# Patient Record
Sex: MALE | Race: BLACK OR AFRICAN AMERICAN | NOT HISPANIC OR LATINO | URBAN - METROPOLITAN AREA
[De-identification: names, ages, dates, MRNs, and addresses within clinical notes are randomized per-mention and may not be internally consistent; named-entity substitution may affect disease eponyms.]

---

## 2024-08-15 ENCOUNTER — APPOINTMENT (RX ONLY)
Dept: URBAN - METROPOLITAN AREA CLINIC 93 | Facility: CLINIC | Age: 49
Setting detail: DERMATOLOGY
End: 2024-08-15

## 2024-08-15 DIAGNOSIS — L81.4 OTHER MELANIN HYPERPIGMENTATION: ICD-10-CM | Status: INADEQUATELY CONTROLLED

## 2024-08-15 DIAGNOSIS — L60.8 OTHER NAIL DISORDERS: ICD-10-CM | Status: STABLE

## 2024-08-15 DIAGNOSIS — Z41.9 ENCOUNTER FOR PROCEDURE FOR PURPOSES OTHER THAN REMEDYING HEALTH STATE, UNSPECIFIED: ICD-10-CM

## 2024-08-15 PROCEDURE — ? SUNSCREEN RECOMMENDATIONS

## 2024-08-15 PROCEDURE — ? PRODUCT LINE (REVISION)

## 2024-08-15 PROCEDURE — ? PRESCRIPTION

## 2024-08-15 PROCEDURE — ? COUNSELING

## 2024-08-15 PROCEDURE — ? ADDITIONAL NOTES

## 2024-08-15 PROCEDURE — ? COSMETIC CONSULTATION: CHEMICAL PEELS

## 2024-08-15 PROCEDURE — 99203 OFFICE O/P NEW LOW 30 MIN: CPT

## 2024-08-15 RX ORDER — HYDROQUINONE 40 MG/G
CREAM TOPICAL QHS
Qty: 28.35 | Refills: 2 | Status: ERX | COMMUNITY
Start: 2024-08-15

## 2024-08-15 RX ADMIN — HYDROQUINONE: 40 CREAM TOPICAL at 00:00

## 2024-08-15 ASSESSMENT — LOCATION ZONE DERM
LOCATION ZONE: FACE
LOCATION ZONE: FINGERNAIL

## 2024-08-15 ASSESSMENT — LOCATION SIMPLE DESCRIPTION DERM
LOCATION SIMPLE: LEFT MIDDLE FINGER
LOCATION SIMPLE: RIGHT MIDDLE FINGER
LOCATION SIMPLE: LEFT CHEEK
LOCATION SIMPLE: RIGHT CHEEK

## 2024-08-15 ASSESSMENT — LOCATION DETAILED DESCRIPTION DERM
LOCATION DETAILED: LEFT MIDDLE FINGERNAIL
LOCATION DETAILED: RIGHT MIDDLE FINGERNAIL
LOCATION DETAILED: RIGHT CENTRAL MALAR CHEEK
LOCATION DETAILED: LEFT CENTRAL MALAR CHEEK

## 2024-08-15 NOTE — PROCEDURE: PRODUCT LINE (REVISION)
Product 23 Units: 0
Product 7 Price (In Dollars - Numeric Only, No Special Characters Or $): 185
Product 45 Price (In Dollars - Numeric Only, No Special Characters Or $): 0.00
Product 4 Application Directions: each morning after cleansing and application of facial serums apply intellishade truphysical
Product 1 Price (In Dollars - Numeric Only, No Special Characters Or $): 42.60
Product 9 Price (In Dollars - Numeric Only, No Special Characters Or $): 199.16
Detail Level: Zone
Product 12 Application Directions: Apply pea sized amount in circular motion on the upper and lower vermillion lip every evening following facial cleansing and topical applications of serum/moisturizer.
Name Of Product 5: C+ brightening eye complex
Product 7 Application Directions: each evening following facial cleansing and application of retinol + Vitamin C correction cream, apply DEJ Face night cream onto full face.
Name Of Product 13: DEJ Daily boosting serum
Product 1 Application Directions: each morning and evening use brightening facial cleanser to gently cleanse and exfoliate skin.
Product 10 Price (In Dollars - Numeric Only, No Special Characters Or $): 65
Product 2 Units: 1
Product 2 Application Directions: After facial cleansing , apply Revision Vitamin C Correction cream follow with application of moisturizer and SPF. For night time use 1 hour before bed perform facial cleansing, apply revision vitamin C correction cream, and retinol
Assigning Risk Information: Per AMA, level of risk is based upon consequences of the problem(s) addressed at the encounter when appropriately treated. Risk also includes medical decision making related to the need to initiate or forego further testing, treatment and/or hospitalization. Over the counter medication are assigned a risk level of low. Prescription medication management is assigned a risk level of moderate.
Product 5 Price (In Dollars - Numeric Only, No Special Characters Or $): 119.37
Product 13 Price (In Dollars - Numeric Only, No Special Characters Or $): 215
Name Of Product 8: DEJ eye cream
Risk Of Complication Category: No MDM
Name Of Product 3: Revision Retinol 1.0
Name Of Product 10: Revision CMT Post procedure cream
Product 10 Application Directions: Apply before bed the day of your Microneedling treatment. Starting the next day apply 2-3 pumps onto the full face once every 6 hours.
Product 8 Price (In Dollars - Numeric Only, No Special Characters Or $): 121.41
Product 5 Application Directions: each morning and evening after facial cleansing, apply a small amount onto lower eyelids. follow with daily mosturizer
Allow Plan To Count Towards E/M Coding: Yes
Product 13 Application Directions: Each morning and evening following facial cleansing, apply 1 pump of serum for full face application on clean/dry skin. Follow with application of vitamin c correction cream, moisturizer, and spf for mornings or retinol for evening.
Name Of Product 11: Hydrating serum
Name Of Product 6: retinol 1.0
Product 8 Application Directions: after facial cleansing apply a pea sized amount to lower eyelids twice daily.
Product 11 Price (In Dollars - Numeric Only, No Special Characters Or $): 105
Send Charges To Patient Encounter: No
Product 3 Application Directions: In the evening after facial cleansing, and application of Vitamin C correction cream, apply a pea sized amount of retinol onto the full face.
Product 6 Price (In Dollars - Numeric Only, No Special Characters Or $): 135.26
Name Of Product 4: Intellishade Trusphysical daily moisturizer
Product 11 Application Directions: After facial cleansing in mornings and evenings apply before moisturizer and sunscreen (exclude sun screen for evening application)
Product 6 Application Directions: for the 4-8 weeks of product use, utilize retinol1.0 on Monday, Wednesday, and Friday nights only. method of use: in the evening after facial cleansing, apply a peas sized amount of retinol onto the entire face (avoiding the corners of the eyes, nose, and mouth) then follow with evening moisturizer.
Name Of Product 12: Youthful lip replenisher
Product 4 Price (In Dollars - Numeric Only, No Special Characters Or $): 86
Name Of Product 1: Brightening faceial cleanser
Name Of Product 7: DEJ Face night Cream
Name Of Product 9: Revision Starter kit
Name Of Product 2: Vitmamin C Correction cream
Product 12 Price (In Dollars - Numeric Only, No Special Characters Or $): 42

## 2024-08-15 NOTE — PROCEDURE: SUNSCREEN RECOMMENDATIONS
General Sunscreen Counseling: I recommended a broad spectrum sunscreen with a SPF of 30 or higher.  I explained that SPF 30 sunscreens block approximately 97 percent of the sun's harmful rays.  Sunscreens should be applied at least 15 minutes prior to expected sun exposure and then every 2 hours after that as long as sun exposure continues. If swimming or exercising sunscreen should be reapplied every 45 minutes to an hour after getting wet or sweating.  One ounce, or the equivalent of a shot glass full of sunscreen, is adequate to protect the skin not covered by a bathing suit. I also recommended a lip balm with a sunscreen as well. Sun protective clothing can be used in lieu of sunscreen but must be worn the entire time you are exposed to the sun's rays.
Detail Level: Generalized
Products Recommended: Physical SPF with zinc / titanium, ex. EltaMD Elements or UV Clear

## 2024-08-15 NOTE — PROCEDURE: ADDITIONAL NOTES
Render Risk Assessment In Note?: no
Additional Notes: The patient is present due to concerns of Hyperpigmentation of periorbital region. The patient was prescribed 4% Hydroquinone for topical treatment and management of his aesthetic concerns. I advised that he utilize the topical for 2-4 weeks prior to pursuing VI Precision Plus peels for effective preparation of his skin for promoted efficacy of treatment outcome. The patient was quoted $389/VI Purify Precision Plus peels.\\n\\nI informed the patient that introducing microneedling will also aid in the treatment of dermal pigmentation to aid in optimal clarity following additional chemical peels. The patient was quoted $400/microneedling service with post care of $329/microneedling alone. \\n\\nI recommended a minimum of 4 VI Precision Plus peels for optimal treatment results and 1-2 microneedling for additional improvement. 
Detail Level: Simple